# Patient Record
Sex: MALE | Race: OTHER | HISPANIC OR LATINO | URBAN - METROPOLITAN AREA
[De-identification: names, ages, dates, MRNs, and addresses within clinical notes are randomized per-mention and may not be internally consistent; named-entity substitution may affect disease eponyms.]

---

## 2019-08-03 ENCOUNTER — EMERGENCY (EMERGENCY)
Facility: HOSPITAL | Age: 34
LOS: 1 days | Discharge: HOME | End: 2019-08-03
Attending: EMERGENCY MEDICINE
Payer: SELF-PAY

## 2019-08-03 VITALS
TEMPERATURE: 98 F | HEIGHT: 60 IN | HEART RATE: 81 BPM | SYSTOLIC BLOOD PRESSURE: 131 MMHG | RESPIRATION RATE: 18 BRPM | DIASTOLIC BLOOD PRESSURE: 81 MMHG | WEIGHT: 130.07 LBS | OXYGEN SATURATION: 98 %

## 2019-08-03 PROCEDURE — 99053 MED SERV 10PM-8AM 24 HR FAC: CPT

## 2019-08-03 PROCEDURE — 99283 EMERGENCY DEPT VISIT LOW MDM: CPT

## 2019-08-03 NOTE — ED ADULT TRIAGE NOTE - HEIGHT IN CM
Duration Of Freeze Thaw-Cycle (Seconds): 20
Number Of Freeze-Thaw Cycles: 2 freeze-thaw cycles
Detail Level: Detailed
Render Post-Care Instructions In Note?: no
Aperture Size (Optional): C
150
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
## 2019-08-04 PROCEDURE — 72100 X-RAY EXAM L-S SPINE 2/3 VWS: CPT | Mod: 26

## 2019-08-04 PROCEDURE — T1013: CPT

## 2019-08-04 PROCEDURE — 72100 X-RAY EXAM L-S SPINE 2/3 VWS: CPT

## 2019-08-04 PROCEDURE — 99283 EMERGENCY DEPT VISIT LOW MDM: CPT

## 2019-08-04 RX ORDER — METHOCARBAMOL 500 MG/1
750 TABLET, FILM COATED ORAL ONCE
Refills: 0 | Status: COMPLETED | OUTPATIENT
Start: 2019-08-04 | End: 2019-08-04

## 2019-08-04 RX ORDER — ACETAMINOPHEN 500 MG
650 TABLET ORAL ONCE
Refills: 0 | Status: COMPLETED | OUTPATIENT
Start: 2019-08-04 | End: 2019-08-04

## 2019-08-04 RX ADMIN — Medication 650 MILLIGRAM(S): at 00:39

## 2019-08-04 RX ADMIN — METHOCARBAMOL 750 MILLIGRAM(S): 500 TABLET, FILM COATED ORAL at 00:39
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
## 2019-08-04 NOTE — ED PROVIDER NOTE - PHYSICAL EXAMINATION
General: well appearing, NAD  Head:  NC, AT  Eyes: EOMI, PERRLA, no scleral icterus  Ears: no battles sign, no erythema/drainage  Nose: midline, normal turbinates, no bleeding/drainage  Throat: uvula midline, no lesions, MMM  Cardiac: RRR, no m/r/g, no lower extremity edema  Respiratory: CTABL, no wheezes/rales, equal chest wall expansions  Abdomen: soft, ND, NT, no rebound tenderness, no guarding, nonperitonitic  Back: Midline lumbar tenderness without skin changes/deformities/stepoffs   MSK/Vascular: full ROM, distal pulses intact, soft compartments, warm extremities, mild right thigh tenderness without erythema/bruising  Neuro: AAOx3, negative pronator drift, strength 5/5, sensation to light touch intact, finger to nose coordination intact, cranial nerves 2-12 intact  Psych: calm, cooperative, normal affect General: well appearing, NAD  Head:  NC, AT  Eyes: EOMI, PERRLA, no scleral icterus, no raccoon eyes  Ears: no battles sign, no erythema/drainage  Nose: midline, normal turbinates, no bleeding/drainage, no battles sign  Throat: uvula midline, no lesions, MMM  Cardiac: RRR, no m/r/g, no lower extremity edema  Respiratory: CTABL, no wheezes/rales, equal chest wall expansions  Abdomen: soft, ND, NT, no rebound tenderness, no guarding, nonperitonitic, no seatbelt sign  Back: Midline lumbar tenderness without skin changes/deformities/stepoffs   MSK/Vascular: full ROM, distal pulses intact, soft compartments, warm extremities, mild right thigh tenderness without erythema/bruising  Neuro: AAOx3, negative pronator drift, strength 5/5, sensation to light touch intact, finger to nose coordination intact, cranial nerves 2-12 intact  Psych: calm, cooperative, normal affect
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
